# Patient Record
Sex: FEMALE | Race: WHITE | NOT HISPANIC OR LATINO | URBAN - METROPOLITAN AREA
[De-identification: names, ages, dates, MRNs, and addresses within clinical notes are randomized per-mention and may not be internally consistent; named-entity substitution may affect disease eponyms.]

---

## 2017-01-05 ENCOUNTER — INPATIENT (INPATIENT)
Facility: HOSPITAL | Age: 32
LOS: 2 days | Discharge: HOME | End: 2017-01-08
Attending: OBSTETRICS & GYNECOLOGY | Admitting: OBSTETRICS & GYNECOLOGY

## 2017-06-13 ENCOUNTER — TRANSCRIPTION ENCOUNTER (OUTPATIENT)
Age: 32
End: 2017-06-13

## 2017-06-27 DIAGNOSIS — Z3A.39 39 WEEKS GESTATION OF PREGNANCY: ICD-10-CM

## 2017-06-27 DIAGNOSIS — Z33.1 PREGNANT STATE, INCIDENTAL: ICD-10-CM

## 2018-12-19 PROBLEM — Z00.00 ENCOUNTER FOR PREVENTIVE HEALTH EXAMINATION: Status: ACTIVE | Noted: 2018-12-19

## 2018-12-20 ENCOUNTER — APPOINTMENT (OUTPATIENT)
Dept: OBGYN | Facility: CLINIC | Age: 33
End: 2018-12-20
Payer: COMMERCIAL

## 2018-12-20 ENCOUNTER — LABORATORY RESULT (OUTPATIENT)
Age: 33
End: 2018-12-20

## 2018-12-20 ENCOUNTER — OUTPATIENT (OUTPATIENT)
Dept: OUTPATIENT SERVICES | Facility: HOSPITAL | Age: 33
LOS: 1 days | Discharge: HOME | End: 2018-12-20

## 2018-12-20 VITALS — BODY MASS INDEX: 22.49 KG/M2 | HEIGHT: 65 IN | WEIGHT: 135 LBS

## 2018-12-20 DIAGNOSIS — T78.09XA ANAPHYLACTIC REACTION DUE TO OTHER FOOD PRODUCTS, INITIAL ENCOUNTER: ICD-10-CM

## 2018-12-20 DIAGNOSIS — N92.0 EXCESSIVE AND FREQUENT MENSTRUATION WITH REGULAR CYCLE: ICD-10-CM

## 2018-12-20 DIAGNOSIS — Z30.41 ENCOUNTER FOR SURVEILLANCE OF CONTRACEPTIVE PILLS: ICD-10-CM

## 2018-12-20 DIAGNOSIS — D68.0 VON WILLEBRAND DISEASE: ICD-10-CM

## 2018-12-20 PROCEDURE — 99395 PREV VISIT EST AGE 18-39: CPT

## 2018-12-20 RX ORDER — NORGESTREL AND ETHINYL ESTRADIOL 0.3-0.03MG
0.3-3 KIT ORAL
Qty: 84 | Refills: 0 | Status: ACTIVE | COMMUNITY
Start: 2018-12-20 | End: 1900-01-01

## 2018-12-24 DIAGNOSIS — Z01.419 ENCOUNTER FOR GYNECOLOGICAL EXAMINATION (GENERAL) (ROUTINE) WITHOUT ABNORMAL FINDINGS: ICD-10-CM

## 2019-05-28 ENCOUNTER — RX RENEWAL (OUTPATIENT)
Age: 34
End: 2019-05-28

## 2019-05-28 RX ORDER — NORGESTREL AND ETHINYL ESTRADIOL 0.3-0.03MG
0.3-3 KIT ORAL
Qty: 84 | Refills: 1 | Status: ACTIVE | COMMUNITY
Start: 2019-01-02 | End: 1900-01-01

## 2021-02-04 ENCOUNTER — TRANSCRIPTION ENCOUNTER (OUTPATIENT)
Age: 36
End: 2021-02-04

## 2021-05-05 ENCOUNTER — LABORATORY RESULT (OUTPATIENT)
Age: 36
End: 2021-05-05

## 2021-05-06 ENCOUNTER — APPOINTMENT (OUTPATIENT)
Dept: OBGYN | Facility: CLINIC | Age: 36
End: 2021-05-06
Payer: COMMERCIAL

## 2021-05-06 VITALS — TEMPERATURE: 98 F | WEIGHT: 138 LBS | HEIGHT: 65 IN | BODY MASS INDEX: 22.99 KG/M2

## 2021-05-06 PROCEDURE — 99395 PREV VISIT EST AGE 18-39: CPT

## 2021-05-06 PROCEDURE — 99072 ADDL SUPL MATRL&STAF TM PHE: CPT

## 2021-08-04 ENCOUNTER — NON-APPOINTMENT (OUTPATIENT)
Age: 36
End: 2021-08-04

## 2021-08-16 ENCOUNTER — NON-APPOINTMENT (OUTPATIENT)
Age: 36
End: 2021-08-16

## 2021-08-17 ENCOUNTER — NON-APPOINTMENT (OUTPATIENT)
Age: 36
End: 2021-08-17

## 2022-03-08 RX ORDER — PROGESTERONE 200 MG/1
200 CAPSULE ORAL
Qty: 5 | Refills: 0 | Status: ACTIVE | COMMUNITY
Start: 2022-03-08 | End: 1900-01-01

## 2022-05-09 ENCOUNTER — LABORATORY RESULT (OUTPATIENT)
Age: 37
End: 2022-05-09

## 2022-05-10 ENCOUNTER — APPOINTMENT (OUTPATIENT)
Dept: OBGYN | Facility: CLINIC | Age: 37
End: 2022-05-10
Payer: COMMERCIAL

## 2022-05-10 VITALS — TEMPERATURE: 98 F | WEIGHT: 140 LBS | HEIGHT: 65 IN | BODY MASS INDEX: 23.32 KG/M2

## 2022-05-10 DIAGNOSIS — N92.0 EXCESSIVE AND FREQUENT MENSTRUATION WITH REGULAR CYCLE: ICD-10-CM

## 2022-05-10 DIAGNOSIS — N94.6 DYSMENORRHEA, UNSPECIFIED: ICD-10-CM

## 2022-05-10 PROCEDURE — 99395 PREV VISIT EST AGE 18-39: CPT

## 2022-05-13 PROBLEM — N94.6 DYSMENORRHEA: Status: ACTIVE | Noted: 2022-05-13

## 2022-05-13 PROBLEM — N92.0 HEAVY MENSES: Status: ACTIVE | Noted: 2022-03-08

## 2022-07-28 ENCOUNTER — NON-APPOINTMENT (OUTPATIENT)
Age: 37
End: 2022-07-28

## 2022-08-28 ENCOUNTER — EMERGENCY (EMERGENCY)
Facility: HOSPITAL | Age: 37
LOS: 0 days | Discharge: HOME | End: 2022-08-29
Attending: EMERGENCY MEDICINE | Admitting: EMERGENCY MEDICINE

## 2022-08-28 VITALS
HEART RATE: 67 BPM | DIASTOLIC BLOOD PRESSURE: 83 MMHG | TEMPERATURE: 100 F | HEIGHT: 65 IN | WEIGHT: 139.99 LBS | RESPIRATION RATE: 18 BRPM | OXYGEN SATURATION: 99 % | SYSTOLIC BLOOD PRESSURE: 176 MMHG
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
## 2022-08-28 DIAGNOSIS — S51.011A LACERATION WITHOUT FOREIGN BODY OF RIGHT ELBOW, INITIAL ENCOUNTER: ICD-10-CM
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
## 2022-08-28 PROCEDURE — 99283 EMERGENCY DEPT VISIT LOW MDM: CPT | Mod: 25

## 2022-08-28 PROCEDURE — 12011 RPR F/E/E/N/L/M 2.5 CM/<: CPT

## 2022-08-28 NOTE — ED ADULT NURSE NOTE - OBJECTIVE STATEMENT
Patient presents s/p fall.  States she fell down stairs and landed on her elbow, suffering a laceration.

## 2022-08-29 VITALS
SYSTOLIC BLOOD PRESSURE: 168 MMHG | OXYGEN SATURATION: 98 % | RESPIRATION RATE: 17 BRPM | HEART RATE: 55 BPM | TEMPERATURE: 98 F | DIASTOLIC BLOOD PRESSURE: 79 MMHG

## 2022-08-29 NOTE — ED PROVIDER NOTE - NSFOLLOWUPINSTRUCTIONS_ED_ALL_ED_FT
Please follow up with your primary care physician within 24-72 hours and return immediately if symptoms worsen. Please return for suture removal in 7 days.     Laceration    WHAT YOU NEED TO KNOW:    A laceration is an injury to the skin and the soft tissue underneath it. Lacerations happen when you are cut or hit by something. They can happen anywhere on the body.     DISCHARGE INSTRUCTIONS:    Return to the emergency department if:     You have heavy bleeding or bleeding that does not stop after 10 minutes of holding firm, direct pressure over the wound.       Your wound opens up.     Contact your healthcare provider if:     You have a fever or chills.       Your laceration is red, warm, or swollen.      You have red streaks on your skin coming from your wound.      You have white or yellow drainage from the wound that smells bad.      You have pain that gets worse, even after treatment.       You have questions or concerns about your condition or care.     Medicines:     Prescription pain medicine may be given. Ask how to take this medicine safely.       Antibiotics help treat or prevent a bacterial infection.       Take your medicine as directed. Contact your healthcare provider if you think your medicine is not helping or if you have side effects. Tell him or her if you are allergic to any medicine. Keep a list of the medicines, vitamins, and herbs you take. Include the amounts, and when and why you take them. Bring the list or the pill bottles to follow-up visits. Carry your medicine list with you in case of an emergency.    Care for your wound as directed:     Do not get your wound wet until your healthcare provider says it is okay. Do not soak your wound in water. Do not go swimming until your healthcare provider says it is okay. Carefully wash the wound with soap and water. Gently pat the area dry or allow it to air dry.       Change your bandages when they get wet, dirty, or after washing. Apply new, clean bandages as directed. Do not apply elastic bandages or tape too tight. Do not put powders or lotions over your incision.       Apply antibiotic ointment as directed. Your healthcare provider may give you antibiotic ointment to put over your wound if you have stitches. If you have strips of tape over your incision, let them dry up and fall off on their own. If they do not fall off within 14 days, gently remove them. If you have glue over your wound, do not remove or pick at it. If your glue comes off, do not replace it with glue that you have at home.       Check your wound every day for signs of infection such as swelling, redness, or pus.     Self-care:     Apply ice on your wound for 15 to 20 minutes every hour or as directed. Use an ice pack, or put crushed ice in a plastic bag. Cover it with a towel. Ice helps prevent tissue damage and decreases swelling and pain.      Use a splint as directed. A splint will decrease movement and stress on your wound. It may help it heal faster. A splint may be used for lacerations over joints or areas of your body that bend. Ask your healthcare provider how to apply and remove a splint.       Decrease scarring of your wound by applying ointments as directed. Do not apply ointments until your healthcare provider says it is okay. You may need to wait until your wound is healed. Ask which ointment to buy and how often to use it. After your wound is healed, use sunscreen over the area when you are out in the sun. You should do this for at least 6 months to 1 year after your injury.     Follow up with your healthcare provider as directed: You may need to follow up in 24 to 48 hours to have your wound checked for infection. You will need to return in 3 to 14 days if you have stitches or staples so they can be removed. Care for your wound as directed to prevent infection and help it heal. Write down your questions so you remember to ask them during your visits.       © Copyright [employer] 2019 All illustrations and images included in CareNotes are the copyrighted property of [employer] or [employer].

## 2022-08-29 NOTE — ED PROVIDER NOTE - PHYSICAL EXAMINATION
Gen: NAD, AOx3  Head: NCAT  HEENT: PERRL, oral mucosa moist, normal conjunctiva, oropharynx clear without exudate or erythema  Lung: CTAB, no respiratory distress, no wheezing, rales, rhonchi  CV: normal s1/s2, rrr, Normal perfusion, pulses 2+ throughout  MSK: No edema, no visible deformities, full range of motion in all 4 extremities, no spinal tenderness  Neuro: No focal neurologic deficits   Skin: No rash/ ecchymosis, 1cm laceration to R elbow w/ no bony tenderness and FROM   Psych: normal affect

## 2022-08-29 NOTE — ED PROVIDER NOTE - NS ED ROS FT
Constitutional: (-) fever  Eyes/ENT: (-) visual changes   Cardiovascular: (-) chest pain, (-) syncope  Respiratory: (-) cough, (-) shortness of breath  Gastrointestinal: (-) vomiting, (-) diarrhea  Genitourinary: (-) dysuria, (-) hesitancy, (-) frequency   Musculoskeletal: (-) neck pain, (-) back pain, (-) joint pain  Integumentary: (-) rash, (-) edema, laceration    Neurological: (-) headache, (-) altered mental status  Allergic/Immunologic: (-) pruritus

## 2022-08-29 NOTE — ED PROVIDER NOTE - CLINICAL SUMMARY MEDICAL DECISION MAKING FREE TEXT BOX
36-year-old female here for trip and fall, injured her right elbow, with 1 cm laceration repaired in the ER, no complications, educated on return precautions, signs of infection, and when to come back for suture removal.

## 2022-08-29 NOTE — ED PROCEDURE NOTE - ATTENDING APP SHARED VISIT CONTRIBUTION OF CARE
I was present for and supervised the key/critical aspects of the procedures performed during the care of the patient.--laceration repair

## 2022-08-29 NOTE — ED PROVIDER NOTE - ATTENDING APP SHARED VISIT CONTRIBUTION OF CARE
36-year-old female with no significant past medical history presents to the ER after a trip and fall going down a couple of steps, hitting her elbow on something, and now with right elbow pain and small laceration.  Patient denies hitting her head, LOC, use of any AC, nausea/vomiting/diarrhea/abdominal pain/chest pain/back pain/rib pain/headache/neck pain/leg pain.  States she is up-to-date on her tetanus (had it a year ago).  Pain predominantly over the laceration on her elbow and denies any bony tenderness.      Of note on exam patient with 1 cm laceration to the right elbow, no bony tenderness, no swelling, full range of motion, neurovascular intact, remainder of exam as per PA note.    A/P patient with a laceration to the right elbow status post open fall today.  Will need suture repair of laceration.  Will provide wound care instructions and when to have sutures removed.  Will instruct on signs of infection and given general return precautions.    ALL: nkda  PMH denies  Meds denies  SH no smoking or etoh  PMD Scafurri  LMP 3 weeks ago, regular

## 2022-08-29 NOTE — ED PROVIDER NOTE - OBJECTIVE STATEMENT
37 yo female with no pertinent pmh present c/o R elbow laceration. pt states she tripped while going down her steps and hit her elbow. pt denies any head trauma/LOC. pt denies any other injuries during event. pt states her tetanus is up to date. pt denies any other symptoms including fevers, chill, headache, recent illness/travel, cough, abdominal pain, chest pain, or SOB.

## 2022-08-29 NOTE — ED PROVIDER NOTE - PATIENT PORTAL LINK FT
You can access the FollowMyHealth Patient Portal offered by [facility] by registering at the following website: [email or web address]. By joining [facility]’s FollowMyHealth portal, you will also be able to view your health information using other applications (apps) compatible with our system.

## 2022-12-11 NOTE — ED PROVIDER NOTE - IV ALTEPLASE INCLUSION HIDDEN
[FreeTextEntry1] : Naomi presents to the pain clinic for follow up after lumbar epidural steroid injection. She had about 50+% relief to date. She still suffers from this back pain that has been somewhat chronic back pain.  She suffered with back pain for about 10 years but over the last 3-4 months pain has gotten more severe and limiting her quality of life and overall ability to perform activities of daily living. Since the epidural, most of those worsened symtpoms have been reduced. She has occasional symptoms of pain that radiates down the bilateral legs, buttock bilateral, posterior thigh, lateral thigh, and anterior and medial shin.  Some associated burning in the lower extremities.  She describes a low back pain is tight and aching. Pain is a 4-5/10 on the pain scale.\par \par She is now complaining of some worsening thoracic back pain. The pain is mid back and may be more prominent and noticeable since her low back pain has significantly reduced since the epidural. The mid back pain has also been somewhat chronic, but over the past few weeks have become more noticeable and bothersome. The pain is described as aching, sore, and occasionally sharp. No radicular symptoms. Pain is 6/10 on the pain scale.  show

## 2023-07-25 DIAGNOSIS — N39.0 URINARY TRACT INFECTION, SITE NOT SPECIFIED: ICD-10-CM

## 2023-07-25 RX ORDER — NITROFURANTOIN (MONOHYDRATE/MACROCRYSTALS) 25; 75 MG/1; MG/1
100 CAPSULE ORAL
Qty: 14 | Refills: 0 | Status: ACTIVE | COMMUNITY
Start: 2023-07-25 | End: 1900-01-01

## 2024-02-27 ENCOUNTER — LABORATORY RESULT (OUTPATIENT)
Age: 39
End: 2024-02-27

## 2024-02-28 ENCOUNTER — APPOINTMENT (OUTPATIENT)
Dept: OBGYN | Facility: CLINIC | Age: 39
End: 2024-02-28
Payer: COMMERCIAL

## 2024-02-28 VITALS — TEMPERATURE: 97.8 F | WEIGHT: 145 LBS | HEIGHT: 65 IN | BODY MASS INDEX: 24.16 KG/M2

## 2024-02-28 DIAGNOSIS — N39.3 STRESS INCONTINENCE (FEMALE) (MALE): ICD-10-CM

## 2024-02-28 DIAGNOSIS — Z01.419 ENCOUNTER FOR GYNECOLOGICAL EXAMINATION (GENERAL) (ROUTINE) W/OUT ABNORMAL FINDINGS: ICD-10-CM

## 2024-02-28 DIAGNOSIS — N39.490 STRESS INCONTINENCE (FEMALE) (MALE): ICD-10-CM

## 2024-02-28 DIAGNOSIS — N81.10 CYSTOCELE, UNSPECIFIED: ICD-10-CM

## 2024-02-28 PROCEDURE — 99395 PREV VISIT EST AGE 18-39: CPT

## 2024-02-28 NOTE — DISCUSSION/SUMMARY
[FreeTextEntry1] : 38 YEAR OLD FEMALE LMP 2/24/2024 WITH CYCLES MONTHLY Q 28 X 4, 1ST TWO DAYS  HEAVIER, PRESENTS FOR WELL WOMAN GYNECOLOGIC EVALUATION AND PAP. LAST SEEN FOR WELL WOMAN VISIT 5/2022. NO NEW MEDICAL OR SURGICAL HISTORY SINCE LAST VISIT. MEDICATIONS; BUSPAR PRN MEDICATION ALLERGIES; NONE ROS;BMS-NORMAL; NO FAM HISTORY OF COLON CANCER         + OVERFLOW STRESS URINARY INCONTINENCE WITH SNEEZE-MILD         SEXUALLY ACTIVE WTIHOUT COMPLAINTS- VASECTOMY BREASTS; DOES BREAST SELF EXAMIANTION                    NO FAM HISTORY OF BREAST CANCER;                    MAMMOGRAPHY DONE 8/18/2021 BIRADS O; ADDTIONAL IMAGING OK -EXERCIES; + MULTIVITAMINS; + DAIRY; - TOBACCO OR VAPING  PE;.62;TEMP 97.8;WEIGHT 145 LBS HEIGHT 5'5"        BREASTS; NO MASSES OR DISCHARGES        ABDOMEN; SOFT, NO MASSES; NO;TENDERNESS TO PALPATION        PELVIC NORMAL EXTERNAL GENITALIA                      NORMAL VAGINA WITHOUT LESION                      1ST DEGREE CYSTOCELE                       NORMAL CERVIX WITHOUT LESION                       ANTEVERTED NORMAL UTERUS ON BIMANUAL EXAMINATION                       NO PALPABLE ADNEXAL MASSES  IMP; WELL WOMAN          OVERFLOW STRESS URINARY INCONITINENCE-MILD          BREAST SELF EXAMINTION MONTHLY CONTINUED TO BE STRONGLY ADVISED          ANNUAL MAMMOGRPAHY ADVISED STARTING AT AGE 39 AND SCRIPT GIVEN FOR 12/2024          RETURN TO OFFICE ONE YEAR OR PRN

## 2024-03-03 ENCOUNTER — APPOINTMENT (OUTPATIENT)
Dept: ORTHOPEDIC SURGERY | Facility: CLINIC | Age: 39
End: 2024-03-03
Payer: COMMERCIAL

## 2024-03-03 VITALS — HEIGHT: 65 IN | BODY MASS INDEX: 24.16 KG/M2 | WEIGHT: 145 LBS

## 2024-03-03 DIAGNOSIS — M54.50 LOW BACK PAIN, UNSPECIFIED: ICD-10-CM

## 2024-03-03 PROCEDURE — 99203 OFFICE O/P NEW LOW 30 MIN: CPT

## 2024-03-03 PROCEDURE — 72110 X-RAY EXAM L-2 SPINE 4/>VWS: CPT

## 2024-03-03 RX ORDER — MELOXICAM 15 MG/1
15 TABLET ORAL
Qty: 30 | Refills: 2 | Status: ACTIVE | COMMUNITY
Start: 2024-03-03 | End: 1900-01-01

## 2024-03-03 RX ORDER — TIZANIDINE 4 MG/1
4 TABLET ORAL
Qty: 7 | Refills: 0 | Status: ACTIVE | COMMUNITY
Start: 2024-03-03 | End: 1900-01-01

## 2024-03-03 NOTE — DISCUSSION/SUMMARY
[de-identified] : The patient is having a nontraumatic aching pain to the paraspinal musculature in the lumbar region.  For these reasons, I sent the patient meloxicam to be taken as needed for pain.  Confirmed no contraindication to NSAIDs.  I will send the patient muscle relaxer.  Tizanidine 4 mg tablets sent to patient pharmacy to be taken as needed to help her sleep.  Confirmed that this medication may make her drowsy/dizzy so to avoid any driving or operating heavy machinery when taking it.  Prescription for physical therapy offered however she kindly declined and would like to hold off at this time.  Encouraged her to rest, ice, compress, and elevate the legs regularly.  Encouraged gentle range of motion activity modification.  No heavy lifting, pushing, or pulling at least until repeat evaluation.  All questions and concerns addressed to patient's satisfaction. Patient expresses full understanding of treatment plan. I will give the patient follow-up with our spine doctor in about 3 weeks for repeat evaluation and treatment.  Red flag symptoms discussed.  She will call with any questions or concerns.

## 2024-03-03 NOTE — HISTORY OF PRESENT ILLNESS
[de-identified] : 38-year-old female here for evaluation of lumbar pain.  Patient reports pain in her lumbar region that which started in the absence of any trauma or falls.  She reports she first noticed it about 2 days ago.  She does report a history of lower back pain.  She denies any numbness or tingling down the legs.

## 2024-03-25 ENCOUNTER — APPOINTMENT (OUTPATIENT)
Dept: ORTHOPEDIC SURGERY | Facility: CLINIC | Age: 39
End: 2024-03-25

## 2024-06-18 DIAGNOSIS — B37.31 ACUTE CANDIDIASIS OF VULVA AND VAGINA: ICD-10-CM

## 2024-06-18 RX ORDER — FLUCONAZOLE 150 MG/1
150 TABLET ORAL DAILY
Qty: 2 | Refills: 1 | Status: ACTIVE | COMMUNITY
Start: 2024-06-18 | End: 1900-01-01

## 2024-10-20 NOTE — ED ADULT NURSE NOTE - NS ED NOTE ABUSE RESPONSE YN
In addition to the advanced practice provider, I personally saw Whitney Meier and performed a substantive portion of the visit including all aspects of the medical decision making.    Medical Decision Making  Patient seen and evaluated at bedside.  Briefly, patient presenting with shortness of breath.  She was found to be 86% on room air in the ED and was placed on 2 L of nasal cannula which has been sufficient.  Lab workup significant for highly elevated BNP.  There is no elevated troponin, however it is downtrending.  Chest x-ray shows findings consistent with CHF and fluid overload.  There is a mild acute kidney injury, however I believe that benefit of initiation of IV Lasix outweigh risks at this time.  Patient's last 2D echo was reviewed which did show a reduced ejection fraction at 30 to 35%.  Patient will be admitted for continued treatment of congestive heart failure with cardiology evaluation and possible repeat 2D echo.    EKG  Normal sinus rhythm with no acute ST elevations.  Ventricular rate of 77 bpm.  Normal axis.  QRS duration of 116.  QTc of 454.    SEP-1  Is this patient to be included in the SEP-1 Core Measure due to severe sepsis or septic shock?   No     Exclusion criteria - the patient is NOT to be included for SEP-1 Core Measure due to:  2+ SIRS criteria are not met    Screenings     Qian Coma Scale  Eye Opening: Spontaneous  Best Verbal Response: Oriented  Best Motor Response: Obeys commands  Qian Coma Scale Score: 15             Patient Referrals:  No follow-up provider specified.    Discharge Medications:  New Prescriptions    No medications on file       FINAL IMPRESSION  No diagnosis found.    Blood pressure (!) 134/55, pulse 85, temperature 98.2 °F (36.8 °C), temperature source Oral, resp. rate 18, height 1.778 m (5' 10\"), weight 76.7 kg (169 lb), SpO2 93%, not currently breastfeeding.     I personally saw the patient and made/approved the management plan and take  Yes

## 2025-03-02 ENCOUNTER — NON-APPOINTMENT (OUTPATIENT)
Age: 40
End: 2025-03-02

## 2025-06-24 RX ORDER — NORETHINDRONE ACETATE 5 MG/1
5 TABLET ORAL
Qty: 30 | Refills: 0 | Status: ACTIVE | COMMUNITY
Start: 2025-06-24 | End: 1900-01-01

## 2025-07-15 ENCOUNTER — LABORATORY RESULT (OUTPATIENT)
Age: 40
End: 2025-07-15

## 2025-07-15 ENCOUNTER — APPOINTMENT (OUTPATIENT)
Dept: OBGYN | Facility: CLINIC | Age: 40
End: 2025-07-15
Payer: COMMERCIAL

## 2025-07-15 VITALS — HEIGHT: 65 IN | TEMPERATURE: 98.2 F | BODY MASS INDEX: 23.32 KG/M2 | WEIGHT: 140 LBS

## 2025-07-15 LAB
APPEARANCE: CLEAR
BILIRUBIN URINE: NEGATIVE
BLOOD URINE: NEGATIVE
COLOR: YELLOW
GLUCOSE QUALITATIVE U: NEGATIVE
KETONES URINE: NEGATIVE
LEUKOCYTE ESTERASE URINE: NEGATIVE
NITRITE URINE: NEGATIVE
PH URINE: 5.5
PROTEIN URINE: NEGATIVE
SPECIFIC GRAVITY URINE: 1.01
UROBILINOGEN URINE: 0.2 (ref 0.2–?)

## 2025-07-15 PROCEDURE — 99396 PREV VISIT EST AGE 40-64: CPT

## 2025-07-20 PROBLEM — F41.9 ANXIETY: Status: ACTIVE | Noted: 2025-07-20

## 2025-07-20 PROBLEM — I10 HYPERTENSION, ESSENTIAL: Status: ACTIVE | Noted: 2025-07-20

## 2025-09-18 DIAGNOSIS — B37.31 ACUTE CANDIDIASIS OF VULVA AND VAGINA: ICD-10-CM

## 2025-09-18 RX ORDER — FLUCONAZOLE 150 MG/1
150 TABLET ORAL DAILY
Qty: 2 | Refills: 2 | Status: ACTIVE | COMMUNITY
Start: 2025-09-18 | End: 1900-01-01